# Patient Record
Sex: MALE | HISPANIC OR LATINO | URBAN - METROPOLITAN AREA
[De-identification: names, ages, dates, MRNs, and addresses within clinical notes are randomized per-mention and may not be internally consistent; named-entity substitution may affect disease eponyms.]

---

## 2023-10-09 ENCOUNTER — OFFICE VISIT (OUTPATIENT)
Dept: URBAN - METROPOLITAN AREA CLINIC 105 | Facility: CLINIC | Age: 63
End: 2023-10-09
Payer: SELF-PAY

## 2023-10-09 ENCOUNTER — LAB OUTSIDE AN ENCOUNTER (OUTPATIENT)
Dept: URBAN - METROPOLITAN AREA CLINIC 105 | Facility: CLINIC | Age: 63
End: 2023-10-09

## 2023-10-09 ENCOUNTER — WEB ENCOUNTER (OUTPATIENT)
Dept: URBAN - METROPOLITAN AREA CLINIC 105 | Facility: CLINIC | Age: 63
End: 2023-10-09

## 2023-10-09 VITALS
HEIGHT: 63 IN | BODY MASS INDEX: 28.92 KG/M2 | TEMPERATURE: 98.1 F | WEIGHT: 163.2 LBS | SYSTOLIC BLOOD PRESSURE: 175 MMHG | DIASTOLIC BLOOD PRESSURE: 89 MMHG | HEART RATE: 55 BPM

## 2023-10-09 DIAGNOSIS — K76.0 FATTY LIVER: ICD-10-CM

## 2023-10-09 DIAGNOSIS — R74.8 ELEVATED LIVER ENZYMES: ICD-10-CM

## 2023-10-09 DIAGNOSIS — K21.00 CHRONIC REFLUX ESOPHAGITIS: ICD-10-CM

## 2023-10-09 PROBLEM — 59621000: Status: ACTIVE | Noted: 2023-10-09

## 2023-10-09 PROBLEM — 266433003: Status: ACTIVE | Noted: 2023-10-09

## 2023-10-09 PROCEDURE — 99203 OFFICE O/P NEW LOW 30 MIN: CPT | Performed by: INTERNAL MEDICINE

## 2023-10-09 PROCEDURE — 99203 OFFICE O/P NEW LOW 30 MIN: CPT

## 2023-10-09 RX ORDER — SUCRALFATE 1 G/1
TAKE ONE TABLET BY MOUTH TWICE A DAY TABLET ORAL
Qty: 60 UNSPECIFIED | Refills: 0 | Status: ACTIVE | COMMUNITY

## 2023-10-09 RX ORDER — PANTOPRAZOLE SODIUM 40 MG/1
1 TABLET TABLET, DELAYED RELEASE ORAL ONCE A DAY
Qty: 30 | Refills: 3 | OUTPATIENT
Start: 2023-10-09

## 2023-10-09 RX ORDER — SUCRALFATE 1 G/1
1 TABLET ON AN EMPTY STOMACH TABLET ORAL TWICE A DAY
Qty: 60 | Refills: 3 | OUTPATIENT
Start: 2023-10-09 | End: 2024-02-05

## 2023-10-09 RX ORDER — PANTOPRAZOLE SODIUM 40 MG/1
TAKE ONE TABLET BY MOUTH ONE TIME DAILY TABLET, DELAYED RELEASE ORAL
Qty: 30 UNSPECIFIED | Refills: 0 | Status: ACTIVE | COMMUNITY

## 2023-10-09 RX ORDER — ONDANSETRON HYDROCHLORIDE 4 MG/1
TAKE ONE TABLET BY MOUTH EVERY 8 HOURS AS NEEDED NAUSEA AND VOMITTING TABLET, FILM COATED ORAL
Qty: 15 UNSPECIFIED | Refills: 0 | Status: ACTIVE | COMMUNITY

## 2023-10-09 RX ORDER — AMLODIPINE BESYLATE 5 MG/1
TAKE 1 TABLET BY MOUTH ONCE DAILY TABLET ORAL
Qty: 30 EACH | Refills: 0 | Status: ACTIVE | COMMUNITY

## 2023-10-09 NOTE — HPI-TODAY'S VISIT:
New patient here with son Ludin as a . They are here as a hospital followup for esophagitis and epigastric pain. Symptoms started in early September. These symptoms never happened before. Patient was having esophageal dysphagia and difficulty breathing. He forced himself to vomit and he had LOC. He did not go to the hospital. Wednesday Oct, 4th he started having esophageal dysphagia  both solids and liquid. Oct 6th he went to [facility] ER. Labs showed AST 44 ALT 60 normal HCT HgB and MCV. CT scan showed esophagitis of distal esophagus, hepatic steatosis, and diverticulosis. RUQ US showed hepatic steatosis. He was discharged with Pantoprazole 40 mg QD and Carafate 1 GM tablet TID.  Taking Pantoprazole 40 mg daily in the morning. Denies current dysphagia, nausea, vomiting and epigastric pain since starting medicaion.  Last colonoscopy 4 years ago at [place]. Per patient, it was WNL. No rectal bleeding. Denies family history of colon cancer and colon polyps.  Had a stent placed in 2013. Not currently on blood thinners. Patient's son states they will establish with a PCP next month.

## 2023-10-30 ENCOUNTER — OUT OF OFFICE VISIT (OUTPATIENT)
Dept: URBAN - METROPOLITAN AREA SURGERY CENTER 16 | Facility: SURGERY CENTER | Age: 63
End: 2023-10-30
Payer: SELF-PAY

## 2023-10-30 ENCOUNTER — CLAIMS CREATED FROM THE CLAIM WINDOW (OUTPATIENT)
Dept: URBAN - METROPOLITAN AREA CLINIC 4 | Facility: CLINIC | Age: 63
End: 2023-10-30
Payer: SELF-PAY

## 2023-10-30 DIAGNOSIS — K31.89 ACHYLIA: ICD-10-CM

## 2023-10-30 DIAGNOSIS — K21.9 GASTRO-ESOPHAGEAL REFLUX DISEASE WITHOUT ESOPHAGITIS: ICD-10-CM

## 2023-10-30 DIAGNOSIS — B96.81 BACTERIAL INFECTION DUE TO H. PYLORI: ICD-10-CM

## 2023-10-30 DIAGNOSIS — K29.60 ADENOPAPILLOMATOSIS GASTRICA: ICD-10-CM

## 2023-10-30 DIAGNOSIS — R10.13 ABDOMINAL DISCOMFORT, EPIGASTRIC: ICD-10-CM

## 2023-10-30 DIAGNOSIS — R13.19 CERVICAL DYSPHAGIA: ICD-10-CM

## 2023-10-30 DIAGNOSIS — K21.9 ACID REFLUX: ICD-10-CM

## 2023-10-30 DIAGNOSIS — K31.89 OTHER DISEASES OF STOMACH AND DUODENUM: ICD-10-CM

## 2023-10-30 PROCEDURE — 00731 ANES UPR GI NDSC PX NOS: CPT | Performed by: NURSE ANESTHETIST, CERTIFIED REGISTERED

## 2023-10-30 PROCEDURE — 43239 EGD BIOPSY SINGLE/MULTIPLE: CPT | Performed by: INTERNAL MEDICINE

## 2023-10-30 PROCEDURE — 88342 IMHCHEM/IMCYTCHM 1ST ANTB: CPT | Performed by: PATHOLOGY

## 2023-10-30 PROCEDURE — 00731 ANES UPR GI NDSC PX NOS: CPT | Performed by: ANESTHESIOLOGY

## 2023-10-30 PROCEDURE — 88305 TISSUE EXAM BY PATHOLOGIST: CPT | Performed by: PATHOLOGY

## 2023-10-30 PROCEDURE — 88312 SPECIAL STAINS GROUP 1: CPT | Performed by: PATHOLOGY

## 2023-10-30 PROCEDURE — G8907 PT DOC NO EVENTS ON DISCHARG: HCPCS | Performed by: INTERNAL MEDICINE

## 2023-10-30 RX ORDER — SUCRALFATE 1 G/1
TAKE ONE TABLET BY MOUTH TWICE A DAY TABLET ORAL
Qty: 60 UNSPECIFIED | Refills: 0 | Status: ACTIVE | COMMUNITY

## 2023-10-30 RX ORDER — AMLODIPINE BESYLATE 5 MG/1
TAKE 1 TABLET BY MOUTH ONCE DAILY TABLET ORAL
Qty: 30 EACH | Refills: 0 | Status: ACTIVE | COMMUNITY

## 2023-10-30 RX ORDER — PANTOPRAZOLE SODIUM 40 MG/1
1 TABLET TABLET, DELAYED RELEASE ORAL ONCE A DAY
Qty: 30 | Refills: 3 | Status: ACTIVE | COMMUNITY
Start: 2023-10-09

## 2023-10-30 RX ORDER — PANTOPRAZOLE SODIUM 40 MG/1
TAKE ONE TABLET BY MOUTH ONE TIME DAILY TABLET, DELAYED RELEASE ORAL
Qty: 30 UNSPECIFIED | Refills: 0 | Status: ACTIVE | COMMUNITY

## 2023-10-30 RX ORDER — SUCRALFATE 1 G/1
1 TABLET ON AN EMPTY STOMACH TABLET ORAL TWICE A DAY
Qty: 60 | Refills: 3 | Status: ACTIVE | COMMUNITY
Start: 2023-10-09 | End: 2024-02-05

## 2023-10-30 RX ORDER — ONDANSETRON HYDROCHLORIDE 4 MG/1
TAKE ONE TABLET BY MOUTH EVERY 8 HOURS AS NEEDED NAUSEA AND VOMITTING TABLET, FILM COATED ORAL
Qty: 15 UNSPECIFIED | Refills: 0 | Status: ACTIVE | COMMUNITY

## 2023-11-06 ENCOUNTER — TELEPHONE ENCOUNTER (OUTPATIENT)
Dept: URBAN - METROPOLITAN AREA CLINIC 105 | Facility: CLINIC | Age: 63
End: 2023-11-06

## 2023-11-06 RX ORDER — AMOXICILLIN 500 MG/1
2 TABLETS TABLET, FILM COATED ORAL TWICE A DAY
Qty: 56 TABLET | Refills: 0 | OUTPATIENT
Start: 2023-11-06 | End: 2023-11-20

## 2023-11-06 RX ORDER — CLARITHROMYCIN 500 MG/1
1 TABLET TABLET, FILM COATED ORAL TWICE A DAY
Qty: 28 TABLET | Refills: 0 | OUTPATIENT
Start: 2023-11-06 | End: 2023-11-20

## 2023-12-08 ENCOUNTER — OFFICE VISIT (OUTPATIENT)
Dept: URBAN - METROPOLITAN AREA CLINIC 105 | Facility: CLINIC | Age: 63
End: 2023-12-08
Payer: SELF-PAY

## 2023-12-08 VITALS
DIASTOLIC BLOOD PRESSURE: 83 MMHG | HEIGHT: 63 IN | HEART RATE: 53 BPM | BODY MASS INDEX: 29.23 KG/M2 | WEIGHT: 165 LBS | SYSTOLIC BLOOD PRESSURE: 180 MMHG | TEMPERATURE: 97.2 F

## 2023-12-08 DIAGNOSIS — K76.0 FATTY LIVER: ICD-10-CM

## 2023-12-08 DIAGNOSIS — R74.8 ELEVATED LIVER ENZYMES: ICD-10-CM

## 2023-12-08 DIAGNOSIS — Z12.11 SCREEN FOR COLON CANCER: ICD-10-CM

## 2023-12-08 DIAGNOSIS — A04.8 BACTERIAL INFECTION DUE TO H. PYLORI: ICD-10-CM

## 2023-12-08 PROBLEM — 197321007: Status: ACTIVE | Noted: 2023-10-09

## 2023-12-08 PROCEDURE — 99214 OFFICE O/P EST MOD 30 MIN: CPT | Performed by: INTERNAL MEDICINE

## 2023-12-08 PROCEDURE — 91065 BREATH HYDROGEN/METHANE TEST: CPT | Performed by: INTERNAL MEDICINE

## 2023-12-08 RX ORDER — ONDANSETRON HYDROCHLORIDE 4 MG/1
TAKE ONE TABLET BY MOUTH EVERY 8 HOURS AS NEEDED NAUSEA AND VOMITTING TABLET, FILM COATED ORAL
Qty: 15 UNSPECIFIED | Refills: 0 | Status: ACTIVE | COMMUNITY

## 2023-12-08 RX ORDER — SUCRALFATE 1 G/1
1 TABLET ON AN EMPTY STOMACH TABLET ORAL TWICE A DAY
Qty: 60 | Refills: 3 | Status: ACTIVE | COMMUNITY
Start: 2023-10-09 | End: 2024-02-05

## 2023-12-08 RX ORDER — PANTOPRAZOLE SODIUM 40 MG/1
1 TABLET TABLET, DELAYED RELEASE ORAL ONCE A DAY
Qty: 30 | Refills: 3 | Status: ACTIVE | COMMUNITY
Start: 2023-10-09

## 2023-12-08 RX ORDER — AMLODIPINE BESYLATE 5 MG/1
TAKE 1 TABLET BY MOUTH ONCE DAILY TABLET ORAL
Qty: 30 EACH | Refills: 0 | Status: ACTIVE | COMMUNITY

## 2023-12-08 NOTE — HPI-TODAY'S VISIT:
He was previously seen by FLACO Jean-Baptiste for an initial visit on 10/9/23.  He was seen in hospital follow-up for epigastric pain and esophagitis - symptoms began in September.  Symptoms also included dysphagia and difficulty breathing.    10/6/23 he went to the [facility] ER for dysphagia and a LOC after forcing himself to vomit.  CT noted esophagitis of the distal esophagus, hepatic steatosis, and DD.  RUQ US noted steatosis.  H/H normal.  AST 44, and AST 60.  He was discharged on pantoprazole 40 mg daily and Carafate TID.    Previous colonoscopy at [place] in 2019 that was normal per pt and instructed to repeat in 10 years.  Post visit his H pylori noted on gastric antral biopsies and he was prescribed treatment.    Today, he says he finished H. pylori treatment. He is taking pantoprazole 40 mg QAM (takes it after eating in the morning) and sucralfate1 gm BID. He's had one episode of epigastric pain - 6 days ago - since his last visit and it lasted around an hour. He describes the pain as a 6/10.   Labs 10/6/23 - AST 44 ALT 60 normal HCT HgB and MCV.

## 2023-12-14 LAB
ALBUMIN/GLOBULIN RATIO: 1.3
ALBUMIN: 4.5
ALKALINE PHOSPHATASE: 135
ALT (SGPT): 75
AST (SGOT): 32
BILIRUBIN, DIRECT: 0.1
BILIRUBIN, INDIRECT: 0.5
BILIRUBIN, TOTAL: 0.6
FERRITIN, SERUM: 251
GLOBULIN: 3.4
H PYLORI BREATH TEST: NOT DETECTED
HBSAG SCREEN: (no result)
HEP A AB, IGM: (no result)
HEP B CORE AB, IGM: (no result)
HEPATITIS C ANTIBODY: (no result)
IRON BIND.CAP.(TIBC): 351
IRON SATURATION: 35
IRON: 123
PROTEIN, TOTAL: 7.9

## 2024-03-01 ENCOUNTER — OV EP (OUTPATIENT)
Dept: URBAN - METROPOLITAN AREA CLINIC 105 | Facility: CLINIC | Age: 64
End: 2024-03-01

## 2024-03-01 RX ORDER — PANTOPRAZOLE SODIUM 40 MG/1
1 TABLET TABLET, DELAYED RELEASE ORAL ONCE A DAY
Qty: 30 | Refills: 3 | COMMUNITY
Start: 2023-10-09

## 2024-03-01 RX ORDER — AMLODIPINE BESYLATE 5 MG/1
TAKE 1 TABLET BY MOUTH ONCE DAILY TABLET ORAL
Qty: 30 EACH | Refills: 0 | COMMUNITY

## 2024-03-01 RX ORDER — ONDANSETRON HYDROCHLORIDE 4 MG/1
TAKE ONE TABLET BY MOUTH EVERY 8 HOURS AS NEEDED NAUSEA AND VOMITTING TABLET, FILM COATED ORAL
Qty: 15 UNSPECIFIED | Refills: 0 | COMMUNITY

## 2024-03-08 ENCOUNTER — OV EP (OUTPATIENT)
Dept: URBAN - METROPOLITAN AREA CLINIC 105 | Facility: CLINIC | Age: 64
End: 2024-03-08

## 2024-04-02 ENCOUNTER — OV EP (OUTPATIENT)
Dept: URBAN - METROPOLITAN AREA CLINIC 105 | Facility: CLINIC | Age: 64
End: 2024-04-02

## 2024-04-02 RX ORDER — ONDANSETRON HYDROCHLORIDE 4 MG/1
TAKE ONE TABLET BY MOUTH EVERY 8 HOURS AS NEEDED NAUSEA AND VOMITTING TABLET, FILM COATED ORAL
Qty: 15 UNSPECIFIED | Refills: 0 | COMMUNITY

## 2024-04-02 RX ORDER — AMLODIPINE BESYLATE 5 MG/1
TAKE 1 TABLET BY MOUTH ONCE DAILY TABLET ORAL
Qty: 30 EACH | Refills: 0 | COMMUNITY

## 2024-04-02 RX ORDER — PANTOPRAZOLE SODIUM 40 MG/1
1 TABLET TABLET, DELAYED RELEASE ORAL ONCE A DAY
Qty: 30 | Refills: 3 | COMMUNITY
Start: 2023-10-09

## 2024-04-02 NOTE — HPI-TODAY'S VISIT:
He was previously seen by FLACO Jean-Baptiste for an initial visit on 10/9/23.  He was seen in hospital follow-up for epigastric pain and esophagitis - symptoms began in September.  Symptoms also included dysphagia and difficulty breathing.    10/6/23 he went to the [facility] ER for dysphagia and a LOC after forcing himself to vomit.  CT noted esophagitis of the distal esophagus, hepatic steatosis, and DD.  RUQ US noted steatosis.  H/H normal.  AST 44, and AST 60.  He was discharged on pantoprazole 40 mg daily and Carafate TID.    Previous colonoscopy at [place] in 2019 that was normal per pt and instructed to repeat in 10 years.  Post visit his H pylori noted on gastric antral biopsies and he was prescribed treatment.    On 12/8/23, he said he finished H. pylori treatment. He was taking pantoprazole 40 mg QAM (took it after eating in the morning) and sucralfate1 gm BID. He had one episode of epigastric pain - 6 days ago - since his last visit and it lasted around an hour. He described the pain as a 6/10.   Labs 12/8/23 - Hepatic function panel normal except ALT 75. H. pylori breath test, hep panel all negative. Iron/TIBC, ferritin all normal. 10/6/23 - AST 44 ALT 60 normal HCT HgB and MCV.